# Patient Record
Sex: MALE | Race: BLACK OR AFRICAN AMERICAN | NOT HISPANIC OR LATINO | Employment: FULL TIME | URBAN - METROPOLITAN AREA
[De-identification: names, ages, dates, MRNs, and addresses within clinical notes are randomized per-mention and may not be internally consistent; named-entity substitution may affect disease eponyms.]

---

## 2024-05-15 ENCOUNTER — OFFICE VISIT (OUTPATIENT)
Dept: UROLOGY | Facility: CLINIC | Age: 39
End: 2024-05-15
Payer: COMMERCIAL

## 2024-05-15 VITALS
HEART RATE: 50 BPM | DIASTOLIC BLOOD PRESSURE: 78 MMHG | BODY MASS INDEX: 24.79 KG/M2 | SYSTOLIC BLOOD PRESSURE: 127 MMHG | HEIGHT: 72 IN | TEMPERATURE: 98.3 F | WEIGHT: 183 LBS

## 2024-05-15 DIAGNOSIS — N52.9 ERECTILE DYSFUNCTION, UNSPECIFIED ERECTILE DYSFUNCTION TYPE: Primary | ICD-10-CM

## 2024-05-15 LAB
POC APPEARANCE, URINE: CLEAR
POC BILIRUBIN, URINE: NEGATIVE
POC BLOOD, URINE: NEGATIVE
POC COLOR, URINE: YELLOW
POC GLUCOSE, URINE: NEGATIVE MG/DL
POC KETONES, URINE: NEGATIVE MG/DL
POC LEUKOCYTES, URINE: NEGATIVE
POC NITRITE,URINE: NEGATIVE
POC PH, URINE: 7.5 PH
POC PROTEIN, URINE: NEGATIVE MG/DL
POC SPECIFIC GRAVITY, URINE: 1.02
POC UROBILINOGEN, URINE: 0.2 EU/DL

## 2024-05-15 PROCEDURE — 1036F TOBACCO NON-USER: CPT | Performed by: UROLOGY

## 2024-05-15 PROCEDURE — 81002 URINALYSIS NONAUTO W/O SCOPE: CPT | Performed by: UROLOGY

## 2024-05-15 PROCEDURE — 99204 OFFICE O/P NEW MOD 45 MIN: CPT | Performed by: UROLOGY

## 2024-05-15 RX ORDER — ACETAMINOPHEN 325 MG/1
650 TABLET ORAL EVERY 6 HOURS PRN
COMMUNITY

## 2024-05-15 ASSESSMENT — PAIN SCALES - GENERAL: PAINLEVEL: 0-NO PAIN

## 2024-05-15 NOTE — PROGRESS NOTES
Subjective   Jovany Gregory is a 39 y.o. male presenting today as a new patient due to erectile dysfunction. Patient reports he has been having worsening ED ongoing for 2 years. He admits to feelings of depression. He has not tried medications for ED. He has difficulty maintaining an erection and does not have any morning erections. He reports normal ejaculation with no pain with ejaculations. Denies any recent gross hematuria, fevers, chills, urinary retention, nausea or vomiting.          No past medical history on file.  Past Surgical History:   Procedure Laterality Date    OTHER SURGICAL HISTORY  06/26/2017    Prior Surgical Procedure Not Done     No family history on file.  Current Outpatient Medications   Medication Sig Dispense Refill    acetaminophen (Tylenol) 325 mg tablet Take 2 tablets (650 mg) by mouth every 6 hours if needed.       No current facility-administered medications for this visit.     Allergies   Allergen Reactions    Shellfish Derived Anaphylaxis     Social History     Socioeconomic History    Marital status: Single     Spouse name: Not on file    Number of children: Not on file    Years of education: Not on file    Highest education level: Not on file   Occupational History    Not on file   Tobacco Use    Smoking status: Never    Smokeless tobacco: Never   Substance and Sexual Activity    Alcohol use: Never    Drug use: Never    Sexual activity: Not on file   Other Topics Concern    Not on file   Social History Narrative    Not on file     Social Determinants of Health     Financial Resource Strain: Not on File (8/26/2019)    Received from [facility]     Financial Resource Strain     Financial Resource Strain: 0   Food Insecurity: Not on File (8/26/2019)    Received from [facility]     Food Insecurity     Food: 0   Transportation Needs: Not on File (8/26/2019)    Received from [facility]     Transportation Needs     Transportation: 0   Physical Activity: Not on File (8/26/2019)    Received from [facility]   "   Physical Activity     Physical Activity: 0   Stress: Not on File (2019)    Received from [facility]     Stress     Stress: 0   Social Connections: Not on File (2019)    Received from [facility]     Social Connections and Isolation: 0   Intimate Partner Violence: Not on file   Housing Stability: Not on File (2019)    Received from [facility]     Housing Stability     Housin       Review of Systems  Pertinent items are noted in HPI.    Objective       Lab Review  No results found for: \"WBC\", \"RBC\", \"HGB\", \"HCT\", \"PLT\"   No results found for: \"BUN\", \"CREATININE\"   No results found for: \"PSA\"  Urine analysis shows negative     Assessment/Plan   Diagnoses and all orders for this visit:  Erectile dysfunction, unspecified erectile dysfunction type  -     POCT UA (nonautomated) manually resulted      Erectile Dysfunction     We discussed obtaining testosterone, prolactin, lipid panel, hemoglobin, hematocrit, FSH, LH, estradiol, serum creatine, and 17-hydroprogesterone levels.    Patient will follow up with Dr. Zapata to review.     I offered patient a referral to Dr. Luis, which he declines.     We discussed a trial of Cialis, he is not interested in medication at this time.     All questions were answered to the patient's satisfaction. Patient agrees with the plan and wishes to proceed. Follow-up will be scheduled appropriately.     Scribed for Dr. Price by Rachel Huerta. I , Dr Price, have personally reviewed and agreed with the information entered by the Virtual Scribe.     "

## 2024-05-31 ENCOUNTER — LAB (OUTPATIENT)
Dept: LAB | Facility: LAB | Age: 39
End: 2024-05-31

## 2024-05-31 DIAGNOSIS — N52.9 ERECTILE DYSFUNCTION, UNSPECIFIED ERECTILE DYSFUNCTION TYPE: ICD-10-CM

## 2024-05-31 LAB
CHOLEST SERPL-MCNC: 190 MG/DL (ref 0–199)
CHOLESTEROL/HDL RATIO: 4.4
CREAT SERPL-MCNC: 1.01 MG/DL (ref 0.5–1.3)
EGFRCR SERPLBLD CKD-EPI 2021: >90 ML/MIN/1.73M*2
EST. AVERAGE GLUCOSE BLD GHB EST-MCNC: 97 MG/DL
FSH SERPL-ACNC: 6.7 IU/L
HBA1C MFR BLD: 5 %
HCT VFR BLD AUTO: 43.1 % (ref 41–52)
HDLC SERPL-MCNC: 43.6 MG/DL
LDLC SERPL CALC-MCNC: 130 MG/DL
LH SERPL-ACNC: 4.8 IU/L
NON HDL CHOLESTEROL: 146 MG/DL (ref 0–149)
PROLACTIN SERPL-MCNC: 14.8 UG/L (ref 2–18)
TESTOST SERPL-MCNC: 795 NG/DL (ref 240–1000)
TRIGL SERPL-MCNC: 81 MG/DL (ref 0–149)
VLDL: 16 MG/DL (ref 0–40)

## 2024-05-31 PROCEDURE — 83002 ASSAY OF GONADOTROPIN (LH): CPT

## 2024-05-31 PROCEDURE — 83036 HEMOGLOBIN GLYCOSYLATED A1C: CPT

## 2024-05-31 PROCEDURE — 83001 ASSAY OF GONADOTROPIN (FSH): CPT

## 2024-05-31 PROCEDURE — 84146 ASSAY OF PROLACTIN: CPT

## 2024-05-31 PROCEDURE — 36415 COLL VENOUS BLD VENIPUNCTURE: CPT

## 2024-05-31 PROCEDURE — 82565 ASSAY OF CREATININE: CPT

## 2024-05-31 PROCEDURE — 85014 HEMATOCRIT: CPT

## 2024-05-31 PROCEDURE — 84403 ASSAY OF TOTAL TESTOSTERONE: CPT

## 2024-05-31 PROCEDURE — 80061 LIPID PANEL: CPT

## 2024-06-11 ENCOUNTER — OFFICE VISIT (OUTPATIENT)
Dept: UROLOGY | Facility: CLINIC | Age: 39
End: 2024-06-11

## 2024-06-11 VITALS — TEMPERATURE: 97 F

## 2024-06-11 DIAGNOSIS — N52.8 OTHER MALE ERECTILE DYSFUNCTION: ICD-10-CM

## 2024-06-11 DIAGNOSIS — N52.9 ERECTILE DYSFUNCTION, UNSPECIFIED ERECTILE DYSFUNCTION TYPE: Primary | ICD-10-CM

## 2024-06-11 DIAGNOSIS — E78.5 ELEVATED LIPIDS: ICD-10-CM

## 2024-06-11 DIAGNOSIS — R39.9 LOWER URINARY TRACT SYMPTOMS (LUTS): ICD-10-CM

## 2024-06-11 PROCEDURE — 1036F TOBACCO NON-USER: CPT | Performed by: UROLOGY

## 2024-06-11 PROCEDURE — 99204 OFFICE O/P NEW MOD 45 MIN: CPT | Performed by: UROLOGY

## 2024-06-11 RX ORDER — TADALAFIL 5 MG/1
5 TABLET ORAL DAILY
Qty: 30 TABLET | Refills: 11 | Status: SHIPPED | OUTPATIENT
Start: 2024-06-11

## 2024-06-11 ASSESSMENT — PAIN SCALES - GENERAL: PAINLEVEL: 0-NO PAIN

## 2024-06-11 NOTE — PROGRESS NOTES
"HPI:  39 y.o. yo male patient complains of  erectile dysfunction, self referral.  Hx of hypospadias surgery    Duration: 'i dont know'-hasn't paid attention, goes down when changing positions  Rigidity of erections:  all the time?/10- isnt as hard as it used to be  Morning Erections: yes  s/p prostatectomy: no  On nitrates/NTG?: No  Smoker: No  Partner: no  Tried PDE5is?- no, doesn't want to try?  Viagra/sildenafil - response:   Cialis/tadalafil- response:   Tried penile injections: no  Libido/Desire: Good  Have been on Testosterone /anabolic steroids? No, T WNL from 6/1  Pain or curvature in penis when erect: None  Premature or delayed ejaculation:  None  Notices more with masturbation     Component      Latest Ref Rng 5/31/2024   CHOLESTEROL      0 - 199 mg/dL 190    HDL CHOLESTEROL      mg/dL 43.6    Cholesterol/HDL Ratio 4.4    LDL Calculated      <=99 mg/dL 130 (H)    VLDL      0 - 40 mg/dL 16    TRIGLYCERIDES      0 - 149 mg/dL 81    Non HDL Cholesterol      0 - 149 mg/dL 146       Legend:  (H) High    Lab Results   Component Value Date    TESTOSTERONE 795 05/31/2024     Lab Results   Component Value Date    LH 4.8 05/31/2024     Lab Results   Component Value Date    FSH 6.7 05/31/2024     No components found for: \"ESTRADIAL\"  No results found for: \"PSA\"  No components found for: \"CBC\"  Lab Results   Component Value Date    PROLACTIN 14.8 05/31/2024     Lab Results   Component Value Date    HGBA1C 5.0 05/31/2024         PMH:  No past medical history on file.     PSH:  Past Surgical History:   Procedure Laterality Date    OTHER SURGICAL HISTORY  06/26/2017    Prior Surgical Procedure Not Done        Medications:    Current Outpatient Medications:     acetaminophen (Tylenol) 325 mg tablet, Take 2 tablets (650 mg) by mouth every 6 hours if needed., Disp: , Rfl:     Allergy:  Allergies   Allergen Reactions    Shellfish Derived Anaphylaxis        Exam  Testicles descended bilaterally, nontender, no masses  Vasa " palpable bilaterally  Penis circ'd, no lesions, no plaques  ? Hypospadias scars      Assessment/Plan  #Erectile Dysfunction: I discussed the etiology and different treatment modalities for erectile dysfunction. Specifically, we talked about lifestyle factors like smoking, diet, and exercise. We also discussed ED as a sign of systemic disease, and the contributions of elevated cholesterol and elevated blood sugars on erections. We then discussed treatment modalities, specifically PDE5 inhibitors, intracavernosal injections, vacuum erectile devices, and penile prostheses.    Trial of Cialis 5mg daily - medication counseling done Discussed side effects including priapism, headaches, stuffiness, and back pain. Discussed that if he gets an erection >4 hours, he needs to present to the emergency room or risk worsening of his erectile dysfunction long term.   Referral to Dr. Luis, sex therapist/counselor    #elevated lipids  -fu with PCP  -pcp referral made (has no pcp)    Fu in 2 months